# Patient Record
Sex: FEMALE | Race: WHITE | NOT HISPANIC OR LATINO | Employment: OTHER | URBAN - METROPOLITAN AREA
[De-identification: names, ages, dates, MRNs, and addresses within clinical notes are randomized per-mention and may not be internally consistent; named-entity substitution may affect disease eponyms.]

---

## 2024-01-15 ENCOUNTER — TELEPHONE (OUTPATIENT)
Dept: NEUROSURGERY | Facility: CLINIC | Age: 84
End: 2024-01-15

## 2024-01-15 ENCOUNTER — APPOINTMENT (OUTPATIENT)
Dept: LAB | Facility: CLINIC | Age: 84
End: 2024-01-15
Payer: MEDICARE

## 2024-01-15 DIAGNOSIS — I65.29 STENOSIS OF CAROTID ARTERY, UNSPECIFIED LATERALITY: ICD-10-CM

## 2024-01-15 DIAGNOSIS — E87.6 HYPOPOTASSEMIA: ICD-10-CM

## 2024-01-15 DIAGNOSIS — Z13.220 SCREENING FOR LIPOID DISORDERS: ICD-10-CM

## 2024-01-15 LAB
ALBUMIN SERPL BCP-MCNC: 4.2 G/DL (ref 3.5–5)
ALP SERPL-CCNC: 46 U/L (ref 34–104)
ALT SERPL W P-5'-P-CCNC: 13 U/L (ref 7–52)
ANION GAP SERPL CALCULATED.3IONS-SCNC: 7 MMOL/L
AST SERPL W P-5'-P-CCNC: 22 U/L (ref 13–39)
BILIRUB SERPL-MCNC: 0.61 MG/DL (ref 0.2–1)
BUN SERPL-MCNC: 14 MG/DL (ref 5–25)
CALCIUM SERPL-MCNC: 9.4 MG/DL (ref 8.4–10.2)
CHLORIDE SERPL-SCNC: 105 MMOL/L (ref 96–108)
CHOLEST SERPL-MCNC: 139 MG/DL
CO2 SERPL-SCNC: 27 MMOL/L (ref 21–32)
CREAT SERPL-MCNC: 0.98 MG/DL (ref 0.6–1.3)
ERYTHROCYTE [DISTWIDTH] IN BLOOD BY AUTOMATED COUNT: 13.2 % (ref 11.6–15.1)
GFR SERPL CREATININE-BSD FRML MDRD: 53 ML/MIN/1.73SQ M
GLUCOSE P FAST SERPL-MCNC: 100 MG/DL (ref 65–99)
HCT VFR BLD AUTO: 37.3 % (ref 34.8–46.1)
HDLC SERPL-MCNC: 60 MG/DL
HGB BLD-MCNC: 12.2 G/DL (ref 11.5–15.4)
LDLC SERPL CALC-MCNC: 60 MG/DL (ref 0–100)
MCH RBC QN AUTO: 32.2 PG (ref 26.8–34.3)
MCHC RBC AUTO-ENTMCNC: 32.7 G/DL (ref 31.4–37.4)
MCV RBC AUTO: 98 FL (ref 82–98)
NONHDLC SERPL-MCNC: 79 MG/DL
PLATELET # BLD AUTO: 304 THOUSANDS/UL (ref 149–390)
PMV BLD AUTO: 10.8 FL (ref 8.9–12.7)
POTASSIUM SERPL-SCNC: 4.1 MMOL/L (ref 3.5–5.3)
PROT SERPL-MCNC: 7 G/DL (ref 6.4–8.4)
RBC # BLD AUTO: 3.79 MILLION/UL (ref 3.81–5.12)
SODIUM SERPL-SCNC: 139 MMOL/L (ref 135–147)
TRIGL SERPL-MCNC: 94 MG/DL
WBC # BLD AUTO: 4.92 THOUSAND/UL (ref 4.31–10.16)

## 2024-01-15 PROCEDURE — 80053 COMPREHEN METABOLIC PANEL: CPT

## 2024-01-15 PROCEDURE — 85027 COMPLETE CBC AUTOMATED: CPT

## 2024-01-15 PROCEDURE — 80061 LIPID PANEL: CPT

## 2024-01-15 PROCEDURE — 36415 COLL VENOUS BLD VENIPUNCTURE: CPT

## 2024-01-15 NOTE — TELEPHONE ENCOUNTER
Pt sister Katerin is caring for pt who had stroke in [facility] but is now living [place], Dr Brink gave referral it is scanned to chart.  Pt sister will have to assist pt with referral due to speech issue.

## 2024-01-15 NOTE — TELEPHONE ENCOUNTER
1/15/24 CALLED AND SPOKE TO PT SISTER ROLY. SHE'S NOT SURE IF SHE NEEDS TO SCHEDULE WITH NEUROSX, NEUROLOGY, OR VASCULAR. REFERRAL FROM DR RIZZO IS FOR VASCULAR BUT PT SISTER MENTIONED DR PARK. I CALLED REF  OFFICE AND STAFF WILL SEND MSG TO MD TO CLARIFY WHAT SPECIALTY. ADVISED STAFF IF FOR NEUROSX, THEN WE NEED SCAN REPORTS FROM [place] FOR APPT, PROVIDED FAX NUMBER. THEY WILL CALL BACK TO ADVISE.

## 2024-01-17 ENCOUNTER — EVALUATION (OUTPATIENT)
Dept: SPEECH THERAPY | Facility: CLINIC | Age: 84
End: 2024-01-17
Payer: MEDICARE

## 2024-01-17 DIAGNOSIS — I65.29 OCCLUSION AND STENOSIS OF UNSPECIFIED CAROTID ARTERY: ICD-10-CM

## 2024-01-17 DIAGNOSIS — R47.01 COMBINED RECEPTIVE AND EXPRESSIVE APHASIA DUE TO ACUTE CEREBROVASCULAR ACCIDENT (CVA): Primary | ICD-10-CM

## 2024-01-17 DIAGNOSIS — I63.9 COMBINED RECEPTIVE AND EXPRESSIVE APHASIA DUE TO ACUTE CEREBROVASCULAR ACCIDENT (CVA): Primary | ICD-10-CM

## 2024-01-17 PROCEDURE — 92523 SPEECH SOUND LANG COMPREHEN: CPT

## 2024-01-17 NOTE — LETTER
2024    Margarita Brink DO   [facility] [address]  [facility] [address]    Patient: Spring Ramos   YOB: 1940   Date of Visit: 2024     Encounter Diagnosis     ICD-10-CM    1. 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 Occlusion and stenosis of unspecified carotid artery  I65.29           Dear Dr. Brink:    Thank you for your recent referral of Spring Ramos. Please review the attached evaluation summary from Spring's recent visit.     Please verify that you agree with the plan of care by signing the attached order.     If you have any questions or concerns, please do not hesitate to call.     I sincerely appreciate the opportunity to share in the care of one of your patients and hope to have another opportunity to work with you in the near future.     Sincerely,    Selene Singh, SLP      Referring Provider:     Based upon review of the patient's progress and continued therapy plan, it is my medical opinion that Spring Ramos should continue speech therapy treatment at the Speech Therapy at [facility]:                    Margarita Brink DO   [facility] [address]  [facility] [address]  Via Fax: [phone]        Speech-Language Pathology Initial Evaluation    Today's date: 2024   Patient’s name: Spring Ramos  : 1940  MRN: [id number]  Precautions (AE, allergies, behavorial, etc.): s/p CVA    Visit tracking  Referring provider: Non-Epic  Billing guidelines: CMS  Visit # 1  Insurance: Medicare  Re-evaluation projected due date: 3/27/2024    Referring provider name: Margarita Brink DO    Hearing: WFL  Vision: Wears glasses - for reading   Communication/language preferences: Verbal  and Gestural     Home environment/lifestyle: Lives with family with support  Highest level of education: Associate's degree  Vocational status: Retired    Subjective behavioral status: Pleasant,  "Cooperative, and Engaged        History of Present Illness    Patient is a 83 y.o. female referred to outpatient skilled Speech Language Pathology services for a Language evaluation s/p CVA 12/10/2023, wherein she was hospitalized for three days. Pt's sister, Katerin, accompanied the pt to today's evaluation. Pt previously lived in [place], where she was living at time of stroke, and has temporarily relocated to [place], where she currently lives with sister/family. Pt was referred by PCP obtained after relocation.     Pertinent medical hx impacting ST POC includes, but is not limited to:  s/p CVA d/t unspecified occlusion or stenosis of unspecified carotid artery.      Skilled evaluation indicated in order to determine current level of functioning, determine impact and severity of communication deficits, determine impact and severity of aphasia , and develop responsive POC as indicated      Patient/CG subjective report of HPI: Pt reported increased difficulty \"getting her words out\" since stroke - \"I used to speak fine but now it's like my speech is all 'garbled'\". Pt's sister indicated \"it sounds like she has a problem with her speech, but she is able to spell out the words she wants to say\". Both pt and CG indicated increased frustration due to reduced communicative effectiveness. Pt reported she has never had any prior medical issues, so now having to deal with communication difficulties, it has been quite challenging for her.     Patient/CG goal(s): to improve expressive communication; \"help me get my words out better\".       Prior Status    Prior functional status: WFL    Previous therapy: None - per pt report, did not recall being seen by ST, no records on file to confirm     Previous therapy assessment results / tx outcomes:     None reported.         Assessments    The [place] Diagnostic Aphasia Examination-Third Edition (BDAE-3) is a comprehensive standardized assessment designed to evaluate a broad range of language " impairments that often arise as a consequence of organic brain dysfunction. The BDAE-3 is divided into five subtests, including conversational & expository speech, auditory comprehension, oral expression, reading, and writing. The results of the BDAE-3 are used to classify a patient’s language profiles into one of the localization-based classifications of aphasia: Broca’s, Wernicke’s, anomic, conduction, transcortical, transcortical motor, transcortical sensory, and global aphasia syndromes, although the test does not always provide a diagnosis or a therapeutic approach. The following results were obtained during the administration of the short form assessment:       Score: Percentile:   SEVERITY RATING:  3/5 80%ile        FLUENCY:     -Phrase length (rating scale) 777 30%ile   -Melodic line (rating scale) 7/7 100%ile   -Grammatical form (rating scale) 7/7 100%ile        CONVERSATION/EXPOSITORY SPEECH:    -Simple social responses 7/7 100%ile        AUDITORY COMPREHENSION:     -Basic word discrimination 16/16 100%ile   -Commands 8/10 40%ile   -Complex ideational material 3/6 30%ile        ARTICULATION:     -Articulatory agility (rating scale) 5/7 50%ile        RECITATION:     -Automatized sequences 4/4 100%ile        REPETITION:     -Words 3/5 30%ile    -Sentences 1/2 60%ile        NAMING:     -Responsive naming 6/10 40%ile   -[place] Naming Test - short DNT DNT   -Special categories 12/12 100%ile        READING:     -Matching cases & scripts 4/4 100%ile   -Number matching 3/4 20%ile   -Picture-word matching 4/4 100%ile   -Oral word reading 9/15 30%ile   -Oral sentence reading 3/5 70%ile   -Oral sentence comprehension 3/3 100%ile   -Sentence/paragraph comprehension 4/4 100%ile        WRITING:     -Form DNT DNT   -Letter choice DNT DNT   -Motor facility DNT DNT   -Primer words DNT DNT   -Regular phonics DNT DNT   -Common irregular words DNT DNT   -Written picture naming  DNT DNT   -Narrative writing DNT DNT  "      Overall, patient presents with a mild-moderate expressive/receptive aphasia with a severity rating of 3 (out of a possible 5 being fluent speech).     3 - The patient can discuss almost all everyday problems with little or no assistance. Reduction of speech and/or comprehension, however, makes conversation about certain material difficult or impossible.          Impressions    Impressions:   Patient presents with moderate receptive/expressive aphasia, with deficits noted primarily with expressive aphasia and relative strengths demonstrated in receptive skills. However, a breakdown in receptive skills was observed with following commands of increasing length and complexity and complex ideational material. Pt demonstrated significant phonemic (literal) paraphasias during structured tasks as well as spontaneous conversation. Word retrieval deficits noted throughout evaluation, though pt was observed self-correcting and self-cueing on numerous occasions (e.g. pt provides herself with an initial letter cues, \"it begins with an ___\"). Pt also often attempted to spell out the word she was trying to express in order to increase communicative effectiveness. Deficits with expressive language were noted with repetition of words and sentences, responsive naming tasks, and oral word reading. Informal testing of automatic sequences revealed difficulty with stating months of the year. Pt would benefit from additional cognitive testing to determine if there are underlying deficits with memory and executive functioning that may also be impacting communication skills, as pt and sister both reported possible issues at home with STM. Pt would also benefit from further testing in the area of naming to more accurately determine severity level of word retrieval deficits. Pt is a good candidate for skilled ST outpatient therapy as she is highly motivated to return to baseline skills.       Recommendations:  Pt would benefit from " "skilled ST services for Language in order to Enhance functional communication, Improve language skills for complex tasks, Enhance word finding, Increase social participation, and Improve QOL    Rehabilitation prognosis: Good rehab potential to reach and maintain prior level of function        Goals & Intervention Plan    Long-term goals:  Pt will improve receptive and expressive language skills to facilitate independence and safety in home/community for return to baseline status upon d/c.   Patient and caregiver will be educated on and demonstrate effective use of compensatory strategies and cueing hierarchy in a variety of functional tasks to improve quality of life and to maximize level of independence.     Short-term goals:  Pt and clinician will complete fall risk, abuse, and suicide screenings.   Pt and clinician will complete BDAE testing (short form) to determine current level of functioning with writing skills.  Pt and clinician will complete BNT testing to determine severity level of anomia present.  Pt will follow commands of increasing length and complexity (3+ commands) with 80% accuracy given max cues fading to IND.   Pt will state the months of the year with 100% accuracy, IND, over 3 consecutive sessions.  Pt will engage in SFA to support length of utterance, improve sentence content, and improve semantic production with 80% acc given max cues fading to IND.   Pt will engage in responsive naming tasks (e.g. \"what do we tell time with?\") with 90% accuracy given max cues fading to IND.   Pt will produce multisyllabic words accurately during 8/10 opportunities w/o presence of phonemic paraphasias given min v/v cues.   Pt and CG will be educated on aphasia and cueing hierarchy to better understand diagnosis and support carryover.     Frequency: 1-2x/week  Duration: 8-10 weeks    Intervention certification from: 1/17/2024  Intervention certification to: 3/27/2024    Intervention comments:   N/A         "

## 2024-01-17 NOTE — PROGRESS NOTES
"Speech-Language Pathology Initial Evaluation    Today's date: 2024   Patient’s name: Spring Ramos  : 1940  MRN: [id number]  Precautions (AE, allergies, behavorial, etc.): s/p CVA    Visit tracking  Referring provider: Non-Epic  Billing guidelines: CMS  Visit # 1  Insurance: Medicare  Re-evaluation projected due date: 3/27/2024    Referring provider name: Margarita Brink DO    Hearing: WFL  Vision: Wears glasses - for reading   Communication/language preferences: Verbal  and Gestural     Home environment/lifestyle: Lives with family with support  Highest level of education: Associate's degree  Vocational status: Retired    Subjective behavioral status: Pleasant, Cooperative, and Engaged        History of Present Illness    Patient is a 83 y.o. female referred to outpatient skilled Speech Language Pathology services for a Language evaluation s/p CVA 12/10/2023, wherein she was hospitalized for three days. Pt's sister, Katerin, accompanied the pt to today's evaluation. Pt previously lived in [place], where she was living at time of stroke, and has temporarily relocated to [place], where she currently lives with sister/family. Pt was referred by PCP obtained after relocation.     Pertinent medical hx impacting ST POC includes, but is not limited to:  s/p CVA d/t unspecified occlusion or stenosis of unspecified carotid artery.      Skilled evaluation indicated in order to determine current level of functioning, determine impact and severity of communication deficits, determine impact and severity of aphasia , and develop responsive POC as indicated      Patient/CG subjective report of HPI: Pt reported increased difficulty \"getting her words out\" since stroke - \"I used to speak fine but now it's like my speech is all 'garbled'\". Pt's sister indicated \"it sounds like she has a problem with her speech, but she is able to spell out the words she wants to say\". Both pt and CG indicated increased frustration due to " "reduced communicative effectiveness. Pt reported she has never had any prior medical issues, so now having to deal with communication difficulties, it has been quite challenging for her.     Patient/CG goal(s): to improve expressive communication; \"help me get my words out better\".       Prior Status    Prior functional status: WFL    Previous therapy: None - per pt report, did not recall being seen by ST, no records on file to confirm     Previous therapy assessment results / tx outcomes:     None reported.         Assessments    The [place] Diagnostic Aphasia Examination-Third Edition (BDAE-3) is a comprehensive standardized assessment designed to evaluate a broad range of language impairments that often arise as a consequence of organic brain dysfunction. The BDAE-3 is divided into five subtests, including conversational & expository speech, auditory comprehension, oral expression, reading, and writing. The results of the BDAE-3 are used to classify a patient’s language profiles into one of the localization-based classifications of aphasia: Broca’s, Wernicke’s, anomic, conduction, transcortical, transcortical motor, transcortical sensory, and global aphasia syndromes, although the test does not always provide a diagnosis or a therapeutic approach. The following results were obtained during the administration of the short form assessment:       Score: Percentile:   SEVERITY RATING:  3/5 80%ile        FLUENCY:     -Phrase length (rating scale) 777 30%ile   -Melodic line (rating scale) 7/7 100%ile   -Grammatical form (rating scale) 7/7 100%ile        CONVERSATION/EXPOSITORY SPEECH:    -Simple social responses 7/7 100%ile        AUDITORY COMPREHENSION:     -Basic word discrimination 16/16 100%ile   -Commands 8/10 40%ile   -Complex ideational material 3/6 30%ile        ARTICULATION:     -Articulatory agility (rating scale) 5/7 50%ile        RECITATION:     -Automatized sequences 4/4 100%ile        REPETITION:   " "  -Words 3/5 30%ile    -Sentences 1/2 60%ile        NAMING:     -Responsive naming 6/10 40%ile   -[place] Naming Test - short DNT DNT   -Special categories 12/12 100%ile        READING:     -Matching cases & scripts 4/4 100%ile   -Number matching 3/4 20%ile   -Picture-word matching 4/4 100%ile   -Oral word reading 9/15 30%ile   -Oral sentence reading 3/5 70%ile   -Oral sentence comprehension 3/3 100%ile   -Sentence/paragraph comprehension 4/4 100%ile        WRITING:     -Form DNT DNT   -Letter choice DNT DNT   -Motor facility DNT DNT   -Primer words DNT DNT   -Regular phonics DNT DNT   -Common irregular words DNT DNT   -Written picture naming  DNT DNT   -Narrative writing DNT DNT       Overall, patient presents with a mild-moderate expressive/receptive aphasia with a severity rating of 3 (out of a possible 5 being fluent speech).     3 - The patient can discuss almost all everyday problems with little or no assistance. Reduction of speech and/or comprehension, however, makes conversation about certain material difficult or impossible.          Impressions    Impressions:   Patient presents with moderate receptive/expressive aphasia, with deficits noted primarily with expressive aphasia and relative strengths demonstrated in receptive skills. However, a breakdown in receptive skills was observed with following commands of increasing length and complexity and complex ideational material. Pt demonstrated significant phonemic (literal) paraphasias during structured tasks as well as spontaneous conversation. Word retrieval deficits noted throughout evaluation, though pt was observed self-correcting and self-cueing on numerous occasions (e.g. pt provides herself with an initial letter cues, \"it begins with an ___\"). Pt also often attempted to spell out the word she was trying to express in order to increase communicative effectiveness. Deficits with expressive language were noted with repetition of words and sentences, " responsive naming tasks, and oral word reading. Informal testing of automatic sequences revealed difficulty with stating months of the year. Pt would benefit from additional cognitive testing to determine if there are underlying deficits with memory and executive functioning that may also be impacting communication skills, as pt and sister both reported possible issues at home with STM. Pt would also benefit from further testing in the area of naming to more accurately determine severity level of word retrieval deficits. Pt is a good candidate for skilled ST outpatient therapy as she is highly motivated to return to baseline skills.       Recommendations:  Pt would benefit from skilled ST services for Language in order to Enhance functional communication, Improve language skills for complex tasks, Enhance word finding, Increase social participation, and Improve QOL    Rehabilitation prognosis: Good rehab potential to reach and maintain prior level of function        Goals & Intervention Plan    Long-term goals:  Pt will improve receptive and expressive language skills to facilitate independence and safety in home/community for return to baseline status upon d/c.   Patient and caregiver will be educated on and demonstrate effective use of compensatory strategies and cueing hierarchy in a variety of functional tasks to improve quality of life and to maximize level of independence.     Short-term goals:  Pt and clinician will complete fall risk, abuse, and suicide screenings.   Pt and clinician will complete BDAE testing (short form) to determine current level of functioning with writing skills.  Pt and clinician will complete BNT testing to determine severity level of anomia present.  Pt will follow commands of increasing length and complexity (3+ commands) with 80% accuracy given max cues fading to IND.   Pt will state the months of the year with 100% accuracy, IND, over 3 consecutive sessions.  Pt will engage in SFA  "to support length of utterance, improve sentence content, and improve semantic production with 80% acc given max cues fading to IND.   Pt will engage in responsive naming tasks (e.g. \"what do we tell time with?\") with 90% accuracy given max cues fading to IND.   Pt will produce multisyllabic words accurately during 8/10 opportunities w/o presence of phonemic paraphasias given min v/v cues.   Pt and CG will be educated on aphasia and cueing hierarchy to better understand diagnosis and support carryover.     Frequency: 1-2x/week  Duration: 8-10 weeks    Intervention certification from: 1/17/2024  Intervention certification to: 3/27/2024    Intervention comments:   N/A   "

## 2024-01-18 ENCOUNTER — HOSPITAL ENCOUNTER (OUTPATIENT)
Dept: CT IMAGING | Facility: HOSPITAL | Age: 84
End: 2024-01-18
Attending: FAMILY MEDICINE
Payer: MEDICARE

## 2024-01-18 DIAGNOSIS — R06.02 SHORTNESS OF BREATH: ICD-10-CM

## 2024-01-18 PROCEDURE — G1004 CDSM NDSC: HCPCS

## 2024-01-18 PROCEDURE — 71250 CT THORAX DX C-: CPT

## 2024-01-19 NOTE — TELEPHONE ENCOUNTER
1/19/24 [phone] AND LMOM FOR THEM TO CALL BACK TO DISCUSS REFERRAL FOR VASCULAR RECEIVED. CALLED PT SISTER ROLY AND ADVISED I LEFT MSG FOR REF MD, SHE STATES PT HAS APPT WITH MD ON MONDAY AND SHE WILL INQUIRE AS WELL. SENT MSG TO DR PARK TO REVIEW CTA HEAD AND NECK 12/11/23 [place] REPORT TO ADVISE IF PT SHOULD BE SCHEDULED.

## 2024-01-22 NOTE — TELEPHONE ENCOUNTER
1/22/24 IF PT CALLS IN FUTURE WANTING NEUROSX/ DR PARK; DR PARK SAID OK TO SCHEDULE WITH HIM AS LONG AS PT HAS CTA ON DISC.

## 2024-01-22 NOTE — TELEPHONE ENCOUNTER
Margarita Brink called in stating patient is not requiring to be consulted by Neurosurgery. They are going to follow with Vascular.

## 2024-01-24 ENCOUNTER — APPOINTMENT (OUTPATIENT)
Dept: SPEECH THERAPY | Facility: CLINIC | Age: 84
End: 2024-01-24
Payer: MEDICARE

## 2024-01-31 ENCOUNTER — OFFICE VISIT (OUTPATIENT)
Dept: SPEECH THERAPY | Facility: CLINIC | Age: 84
End: 2024-01-31
Payer: MEDICARE

## 2024-01-31 DIAGNOSIS — I63.9 COMBINED RECEPTIVE AND EXPRESSIVE APHASIA DUE TO ACUTE CEREBROVASCULAR ACCIDENT (CVA): Primary | ICD-10-CM

## 2024-01-31 DIAGNOSIS — R47.01 COMBINED RECEPTIVE AND EXPRESSIVE APHASIA DUE TO ACUTE CEREBROVASCULAR ACCIDENT (CVA): Primary | ICD-10-CM

## 2024-01-31 DIAGNOSIS — I65.29 OCCLUSION AND STENOSIS OF UNSPECIFIED CAROTID ARTERY: ICD-10-CM

## 2024-01-31 PROCEDURE — 92507 TX SP LANG VOICE COMM INDIV: CPT

## 2024-01-31 NOTE — PROGRESS NOTES
"Speech Treatment Note    Today's date: 2024  Patient’s name: Spring Ramos  : 1940  MRN: [id number]  Safety measures: n/a  Referring provider: Margarita Brink DO  Visit: # 2    Subjective/Behavioral    Pleasant, Cooperative, and Engaged    Assessment Notes & Comments    Pt and CG had many questions today re pt's overall prognosis and benefited greatly from education provided. Pt will continue to benefit from skilled interventions. Continue with POC.     Intervention/Treatment Goals    Long-term goals:  Pt will improve receptive and expressive language skills to facilitate independence and safety in home/community for return to baseline status upon d/c.   Patient and caregiver will be educated on and demonstrate effective use of compensatory strategies and cueing hierarchy in a variety of functional tasks to improve quality of life and to maximize level of independence.     Short-term goals:  Pt and clinician will complete fall risk, abuse, and suicide screenings.     Pt and clinician will complete BDAE testing (short form) to determine current level of functioning with writing skills.    Pt and clinician will complete BNT testing to determine severity level of anomia present.    Pt will follow commands of increasing length and complexity (3+ commands) with 80% accuracy given max cues fading to IND.     Pt will state the months of the year with 100% accuracy, IND, over 3 consecutive sessions.    Pt will engage in SFA to support length of utterance, improve sentence content, and improve semantic production with 80% acc given max cues fading to IND.     Pt will engage in responsive naming tasks (e.g. \"what do we tell time with?\") with 90% accuracy given max cues fading to IND.   24: completed fill in the blank task with 66% accuracy given min-mod cues.     Pt will produce multisyllabic words accurately during 8/10 opportunities w/o presence of phonemic paraphasias given min v/v cues.     Pt and CG " will be educated on aphasia and cueing hierarchy to better understand diagnosis and support carryover.   1/31/24: Pt and CG provided with extensive education (30+ minutes) re cueing hierarchy with handout to follow, modeling provided, explanation provided and re aphasia etiology, symptoms, prognosis, etc.     Frequency: 1-2x/week  Duration: 8-10 weeks    Intervention certification from: 1/17/2024  Intervention certification to: 3/27/2024    Intervention comments:   N/A     Plan    Pt was provided with HEP/Activities to support POC and Continue with POC

## 2024-02-07 ENCOUNTER — OFFICE VISIT (OUTPATIENT)
Dept: SPEECH THERAPY | Facility: CLINIC | Age: 84
End: 2024-02-07
Payer: MEDICARE

## 2024-02-07 DIAGNOSIS — I63.9 COMBINED RECEPTIVE AND EXPRESSIVE APHASIA DUE TO ACUTE CEREBROVASCULAR ACCIDENT (CVA): Primary | ICD-10-CM

## 2024-02-07 DIAGNOSIS — I65.29 OCCLUSION AND STENOSIS OF UNSPECIFIED CAROTID ARTERY: ICD-10-CM

## 2024-02-07 DIAGNOSIS — R47.01 COMBINED RECEPTIVE AND EXPRESSIVE APHASIA DUE TO ACUTE CEREBROVASCULAR ACCIDENT (CVA): Primary | ICD-10-CM

## 2024-02-07 PROCEDURE — 92507 TX SP LANG VOICE COMM INDIV: CPT

## 2024-02-07 NOTE — PROGRESS NOTES
"Speech Treatment Note    Today's date: 2024  Patient’s name: Spring Ramos  : 1940  MRN: [id number]  Safety measures: n/a  Referring provider: Margarita Brink DO  Visit: # 3    Subjective/Behavioral    Pleasant, Cooperative, and Engaged    Assessment Notes & Comments    Pt and CG (sister) benefit from skilled interventions for education re cueing strategies, appropriate level activities, aphasia education, etc. Pt already demonstrating some improvement with responding to cues and is benefiting from interventions to support word finding. Continue with POC.     Intervention/Treatment Goals    Long-term goals:  Pt will improve receptive and expressive language skills to facilitate independence and safety in home/community for return to baseline status upon d/c.   Patient and caregiver will be educated on and demonstrate effective use of compensatory strategies and cueing hierarchy in a variety of functional tasks to improve quality of life and to maximize level of independence.     Short-term goals:  Pt and clinician will complete fall risk, abuse, and suicide screenings.     Pt and clinician will complete BDAE testing (short form) to determine current level of functioning with writing skills.    Pt and clinician will complete BNT testing to determine severity level of anomia present.    Pt will follow commands of increasing length and complexity (3+ commands) with 80% accuracy given max cues fading to IND.     Pt will state the months of the year with 100% accuracy, IND, over 3 consecutive sessions.    Pt will engage in SFA to support length of utterance, improve sentence content, and improve semantic production with 80% acc given max cues fading to IND.   24: pt completed SFA with graphic organizer and gradual release from cueing mod/max to min - 3/3 words (all categories addressed)    Pt will engage in responsive naming tasks (e.g. \"what do we tell time with?\") with 90% accuracy given max cues " fading to IND.   1/31/24: completed fill in the blank task with 66% accuracy given min-mod cues.   2/7/24: challenge descriptions - 6/7 given mod v/v cues.     Pt will produce multisyllabic words accurately during 8/10 opportunities w/o presence of phonemic paraphasias given min v/v cues.     Pt and CG will be educated on aphasia and cueing hierarchy to better understand diagnosis and support carryover.   1/31/24: Pt and CG provided with extensive education (30+ minutes) re cueing hierarchy with handout to follow, modeling provided, explanation provided and re aphasia etiology, symptoms, prognosis, etc.     Frequency: 1-2x/week  Duration: 8-10 weeks    Intervention certification from: 1/17/2024  Intervention certification to: 3/27/2024    Intervention comments:   Recommend addressing apraxia (multisyllabic words) with backward chaining     Plan    Pt was provided with HEP/Activities to support POC and Continue with POC

## 2024-02-14 ENCOUNTER — OFFICE VISIT (OUTPATIENT)
Dept: SPEECH THERAPY | Facility: CLINIC | Age: 84
End: 2024-02-14
Payer: MEDICARE

## 2024-02-14 DIAGNOSIS — I63.9 COMBINED RECEPTIVE AND EXPRESSIVE APHASIA DUE TO ACUTE CEREBROVASCULAR ACCIDENT (CVA): Primary | ICD-10-CM

## 2024-02-14 DIAGNOSIS — I65.29 OCCLUSION AND STENOSIS OF UNSPECIFIED CAROTID ARTERY: ICD-10-CM

## 2024-02-14 DIAGNOSIS — R47.01 COMBINED RECEPTIVE AND EXPRESSIVE APHASIA DUE TO ACUTE CEREBROVASCULAR ACCIDENT (CVA): Primary | ICD-10-CM

## 2024-02-14 PROCEDURE — 92507 TX SP LANG VOICE COMM INDIV: CPT

## 2024-02-14 NOTE — PROGRESS NOTES
"Speech Treatment Note    Today's date: 2024  Patient’s name: Spring Ramos  : 1940  MRN: [id number]  Safety measures: n/a  Referring provider: Margarita Brink DO  Visit: # 4    Subjective/Behavioral    Pleasant, Cooperative, and Engaged    Assessment Notes & Comments    Pt's niece, Vianey, present for session today and engaged in cueing strategies throughout. Education provided to both re ideas to practice writing numbers through copying and dictation at home (start with double digits only). Education provided on \"zone of proximal development\" as it pertains to stroke recovery. Slight reduction in cues for SFA activity today vs last session which demonstrates improvement.     Intervention/Treatment Goals    Long-term goals:  Pt will improve receptive and expressive language skills to facilitate independence and safety in home/community for return to baseline status upon d/c.   Patient and caregiver will be educated on and demonstrate effective use of compensatory strategies and cueing hierarchy in a variety of functional tasks to improve quality of life and to maximize level of independence.     Short-term goals:  Pt and clinician will complete fall risk, abuse, and suicide screenings.     Pt and clinician will complete BDAE testing (short form) to determine current level of functioning with writing skills.    Pt and clinician will complete BNT testing to determine severity level of anomia present.    Pt will follow commands of increasing length and complexity (3+ commands) with 80% accuracy given max cues fading to IND.     Pt will state the months of the year with 100% accuracy, IND, over 3 consecutive sessions.    Pt will engage in SFA to support length of utterance, improve sentence content, and improve semantic production with 80% acc given max cues fading to IND.   24: pt completed SFA with graphic organizer and gradual release from cueing mod/max to min - 3/3 words (all categories " "addressed)  2/14/24: pt completed SFA with graphic organizer with min cues and consistent verbal prompting, 2/2 words (all categories addressed)    Pt will engage in responsive naming tasks (e.g. \"what do we tell time with?\") with 90% accuracy given max cues fading to IND.   1/31/24: completed fill in the blank task with 66% accuracy given min-mod cues.   2/7/24: challenge descriptions - 6/7 given mod v/v cues.     Pt will produce multisyllabic words accurately during 8/10 opportunities w/o presence of phonemic paraphasias given min v/v cues.     Pt and CG will be educated on aphasia and cueing hierarchy to better understand diagnosis and support carryover.   1/31/24: Pt and CG provided with extensive education (30+ minutes) re cueing hierarchy with handout to follow, modeling provided, explanation provided and re aphasia etiology, symptoms, prognosis, etc.   2/14/24: education provided (15 minutes) on strategies to support writing at home via copying and dictation. 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

## 2024-02-15 ENCOUNTER — OFFICE VISIT (OUTPATIENT)
Dept: CARDIOLOGY CLINIC | Facility: CLINIC | Age: 84
End: 2024-02-15
Payer: MEDICARE

## 2024-02-15 VITALS
SYSTOLIC BLOOD PRESSURE: 112 MMHG | BODY MASS INDEX: 22.83 KG/M2 | HEIGHT: 57 IN | WEIGHT: 105.8 LBS | HEART RATE: 83 BPM | DIASTOLIC BLOOD PRESSURE: 60 MMHG

## 2024-02-15 DIAGNOSIS — I34.0 NONRHEUMATIC MITRAL VALVE REGURGITATION: Primary | ICD-10-CM

## 2024-02-15 DIAGNOSIS — I42.9 CARDIOMYOPATHY, UNSPECIFIED TYPE (HCC): ICD-10-CM

## 2024-02-15 DIAGNOSIS — R06.02 SHORTNESS OF BREATH: ICD-10-CM

## 2024-02-15 DIAGNOSIS — I63.9 CEREBROVASCULAR ACCIDENT (CVA), UNSPECIFIED MECHANISM (HCC): ICD-10-CM

## 2024-02-15 DIAGNOSIS — I50.33 ACUTE ON CHRONIC DIASTOLIC (CONGESTIVE) HEART FAILURE (HCC): ICD-10-CM

## 2024-02-15 DIAGNOSIS — I63.239 CAROTID STENOSIS, SYMPTOMATIC, WITH INFARCTION (HCC): ICD-10-CM

## 2024-02-15 PROCEDURE — 93000 ELECTROCARDIOGRAM COMPLETE: CPT | Performed by: INTERNAL MEDICINE

## 2024-02-15 PROCEDURE — 99205 OFFICE O/P NEW HI 60 MIN: CPT | Performed by: INTERNAL MEDICINE

## 2024-02-15 RX ORDER — TORSEMIDE 10 MG/1
20 TABLET ORAL DAILY
COMMUNITY
End: 2024-02-15 | Stop reason: SDUPTHER

## 2024-02-15 RX ORDER — AMLODIPINE BESYLATE 10 MG/1
5 TABLET ORAL DAILY
COMMUNITY

## 2024-02-15 RX ORDER — ROSUVASTATIN CALCIUM 10 MG/1
10 TABLET, COATED ORAL DAILY
COMMUNITY

## 2024-02-15 RX ORDER — VITAMIN K2 90 MCG
1 CAPSULE ORAL DAILY
COMMUNITY

## 2024-02-15 RX ORDER — POTASSIUM CHLORIDE 750 MG/1
10 TABLET, FILM COATED, EXTENDED RELEASE ORAL 2 TIMES DAILY
COMMUNITY

## 2024-02-15 RX ORDER — TORSEMIDE 20 MG/1
TABLET ORAL
Qty: 60 TABLET | Refills: 1 | Status: SHIPPED | OUTPATIENT
Start: 2024-02-15

## 2024-02-15 RX ORDER — ALBUTEROL SULFATE 90 UG/1
2 AEROSOL, METERED RESPIRATORY (INHALATION) EVERY 6 HOURS PRN
COMMUNITY

## 2024-02-15 RX ORDER — CLOPIDOGREL BISULFATE 75 MG/1
75 TABLET ORAL DAILY
COMMUNITY

## 2024-02-15 NOTE — PROGRESS NOTES
[facility] [place]   [address] [facility] [id number]                                            Cardiology Office Consult  Spring Ramos, 83 y.o. female  YOB: 1940  MRN: [id number] Encounter: [id number]      PCP - Margarita Brink DO  Referring Provider - Self, Referral    Chief Complaint   Patient presents with   • New Patient Visit     Hx Stroke        Assessment  Mitral regurgitation  TTE ([place], [place]) - 12/2024 - LVEF 50%, hypokinesis of inferolateral and anterolateral walls, moderate-severe MR, mild-moderate PHTN - PASP 41  Chronic diastolic heart failure  Shortness of breath  Recent left parietal stroke  Recent type 2 MI  Left carotid artery stenosis  Hypertension  Hyperlipidemia    Plan  Acute on chronic diastolic heart failure, mitral regurgitation, moderate to severe  Clinically appears volume overloaded with significant lower extremity edema, orthopnea and PND  Currently on torsemide 10 mg daily, which was started in December 2023  Increase torsemide to 40 mg daily for 3 days and then continue torsemide 20 mg daily  Check follow-up echocardiogram to better evaluate MR and LVEF  Check follow-up BMP in 1 week  Reduce amlodipine to 5 mg daily to help with pedal edema & monitor BP    Cardiomyopathy, recent type 2 MI  She was found to have low normal EF at 50% with hypokinesis of lateral walls and there were concerns about type II MI  She does continue to have shortness of breath which appears to be related to heart failure  Check follow-up echocardiogram and evaluate further for ischemia with nuclear Lexiscan stress    Hypertension  Blood pressure well-controlled, 112/60  She is on high-dose amlodipine 10 mg daily and has significant lower extremity edema, some of which may be from the amlodipine  Decrease amlodipine to 5 mg daily and monitor blood pressure    Recent stroke, Left carotid artery stenosis  In 12/2023 with aphasia ->  improving with speech therapy  On evaluation in [place], this was felt to be related to severe left carotid artery stenosis and she was recommended carotid endarterectomy but she adamantly refused  She was prescribed aspirin, Plavix and also placed on Eliquis for 30 days --> she uses for 3 days but then had significant nosebleed and as a result stop Eliquis by herself  Remains on aspirin, Plavix, rosuvastatin per primary  Follow-up with PCP/neurologist/vascular surgeon for further treatment    Results for orders placed or performed in visit on 02/15/24   POCT ECG    Impression    Sinus rhythm with PACs  Otherwise normal ECG       Orders Placed This Encounter   Procedures   • Basic metabolic panel   • POCT ECG   • Echo complete w/ contrast if indicated     Return in about 4 weeks (around 3/14/2024), or if symptoms worsen or fail to improve.    Extensive review of limited available records from [facility] system was necessary for today's visit.  Additionally I have reached out to the hospital where she was admitted in [place] in order to try and get her records linked up in epic.  Extensively counseled both patient, her sister and niece about the cardiac problems and need for further evaluation and management.      History of Present Illness   83 y.o. female comes in as a new patient for establishment of care regarding recent findings of heart failure and type 2 MI    She used to live in [place] and in December 2023 had issues with vision problems and speech difficulty which led her into the hospital in [facility].  She was eventually diagnosed with left parietal stroke and was found to have severe left carotid artery stenosis.  She was recommended carotid endarterectomy, but apparently due to concerns about her  having had a stroke during such procedure she adamantly refused to get this done.  During this hospitalization, she also had issues with mild troponin elevation in the 700s, with concerns  about type II MI.  She did not have any chest pain or shortness of breath at that time and an echocardiogram performed during this showed low normal EF at 50% with inferolateral hypokinesis and moderate to severe MR.  There is concerned about probable old left circumflex territory infarct and resultant MR.  She was treated with IV diuresis during this and recommended outpatient cardiology follow-up.  She was discharged within a short period.    Subsequently, as most of her family including her sister and niece live appear in the [facility] area they decided to bring her here instead to help care for her.  She has transition to getting speech therapy here and is doing a lot better.  She was subsequently established with a PCP and is now here for further cardiac consultation and is awaiting vascular surgery follow-up as well    No current complaints of chest pain, palpitations or dizziness.  She does report ongoing issues with shortness of breath with mild to moderate exertion as well as orthopnea and PND.  She has had increased lower extremity edema over the last month.        Historical Information   Past Medical History:   Diagnosis Date   • Acute ischemic left middle cerebral artery (MCA) stroke 12/11/2023   • Carotid artery stenosis    • Diastolic CHF    • Hyperlipidemia    • Hypertension    • Mitral regurgitation    • NSTEMI (non-ST elevated myocardial infarction) 12/11/2023     History reviewed. No pertinent surgical history.  Family History   Problem Relation Age of Onset   • Kidney failure Mother    • Heart disease Father    • Diabetes Father    • Hypertension Father      Current Outpatient Medications   Medication Instructions   • albuterol (PROVENTIL HFA,VENTOLIN HFA) 90 mcg/act inhaler 2 puffs, Inhalation, Every 6 hours PRN   • amLODIPine (NORVASC) 5 mg, Oral, Daily   • Aspirin 81 MG CAPS 1 capsule, Oral, Daily   • Cholecalciferol (Vitamin D3) 1000 units CAPS 1 tablet, Oral, Daily   • clopidogrel  "(PLAVIX) 75 mg, Oral, Daily   • Coenzyme Q10 (Co Q 10) 100 MG CAPS 1 capsule, Oral, Daily   • potassium chloride (Klor-Con) 10 mEq tablet 10 mEq, Oral, 2 times daily   • rosuvastatin (CRESTOR) 10 mg, Oral, Daily   • torsemide (DEMADEX) 20 mg tablet Take 2 tablets daily for 3 days, and then decrease to 1 tablet daily      No Known Allergies  Social History     Socioeconomic History   • Marital status:      Spouse name: None   • Number of children: None   • Years of education: None   • Highest education level: None   Occupational History   • None   Tobacco Use   • Smoking status: Former     Types: Cigarettes   • Smokeless tobacco: Never   Vaping Use   • Vaping status: Never Used   Substance and Sexual Activity   • Alcohol use: Not Currently   • Drug use: Never   • Sexual activity: None   Other Topics Concern   • None   Social History Narrative   • None     Social Determinants of Health     Financial Resource Strain: Not on file   Food Insecurity: Not on file   Transportation Needs: Not on file   Physical Activity: Not on file   Stress: Not on file   Social Connections: Not on file   Intimate Partner Violence: Not on file   Housing Stability: Not on file        Review of Systems   All other systems reviewed and are negative.        Vitals:  Vitals:    02/15/24 1409   BP: 112/60   Pulse: 83   Weight: 48 kg (105 lb 12.8 oz)   Height: 4' 9\" (1.448 m)     BMI - Body mass index is 22.89 kg/m².  Wt Readings from Last 7 Encounters:   02/15/24 48 kg (105 lb 12.8 oz)       Physical Exam  Vitals and nursing note reviewed.   Constitutional:       General: She is not in acute distress.     Appearance: Normal appearance. She is well-developed. She is not ill-appearing.   HENT:      Head: Normocephalic and atraumatic.      Nose: No congestion.   Eyes:      General: No scleral icterus.     Conjunctiva/sclera: Conjunctivae normal.   Neck:      Vascular: No carotid bruit or JVD.   Cardiovascular:      Rate and Rhythm: Normal " "rate and regular rhythm. Occasional Extrasystoles are present.     Pulses: Normal pulses.      Heart sounds: Normal heart sounds. No murmur heard.     No friction rub. No gallop.   Pulmonary:      Effort: Pulmonary effort is normal. No respiratory distress.      Breath sounds: Normal breath sounds. No rales.   Abdominal:      General: There is no distension.      Palpations: Abdomen is soft.      Tenderness: There is no abdominal tenderness.   Musculoskeletal:         General: No swelling or tenderness.      Cervical back: Neck supple.      Right lower leg: Edema present.      Left lower leg: Edema present.   Skin:     General: Skin is warm.   Neurological:      Mental Status: She is alert and oriented to person, place, and time. Mental status is at baseline.      Comments: Intermittent, brief aphasia/speech difficulty - overall much improved   Psychiatric:         Attention and Perception: Attention normal.         Mood and Affect: Mood normal.         Behavior: Behavior normal. Behavior is cooperative.         Thought Content: Thought content normal.         Labs:  CBC:   Lab Results   Component Value Date    WBC 4.92 01/15/2024    RBC 3.79 (L) 01/15/2024    HGB 12.2 01/15/2024    HCT 37.3 01/15/2024    MCV 98 01/15/2024     01/15/2024    RDW 13.2 01/15/2024       CMP:   Lab Results   Component Value Date    K 4.1 01/15/2024     01/15/2024    CO2 27 01/15/2024    BUN 14 01/15/2024    CREATININE 0.98 01/15/2024    EGFR 53 01/15/2024    CALCIUM 9.4 01/15/2024    AST 22 01/15/2024    ALT 13 01/15/2024    ALKPHOS 46 01/15/2024       Magnesium:  No results found for: \"MG\"    Lipid Profile:   Lab Results   Component Value Date    HDL 60 01/15/2024    TRIG 94 01/15/2024    LDLCALC 60 01/15/2024       Thyroid Studies: No results found for: \"GID8SVAUDHOG\", \"T3FREE\", \"FREET4\", \"Z2UOSHY\", \"M8GVIIO\"    A1c:  No components found for: \"HGA1C\"    INR:  No results found for: \"INR\"5    Cardiac testing:   No results " found for this or any previous visit.    No results found for this or any previous visit.    No results found for this or any previous visit.    No results found for this or any previous visit.      CT chest wo contrast  Narrative: CT CHEST WITHOUT IV CONTRAST    INDICATION: R06.02: Shortness of breath.    COMPARISON: None.    TECHNIQUE: CT examination of the chest was performed without intravenous contrast. Multiplanar 2D reformatted images were created from the source data.    This examination, like all CT scans performed in the [facility], was performed utilizing techniques to minimize radiation dose exposure, including the use of iterative reconstruction and automated exposure control. Radiation dose length   product (DLP) for this visit: 224.42 mGy-cm    FINDINGS:    LUNGS: Mild centrilobular emphysema. Biapical pleural-parenchymal scarring. Bibasilar atelectasis. Mild bronchial wall thickening in the posterior lungs bilaterally, which could be due to bronchitis. No nodules or focal consolidations. Scattered   granulomata. No tracheal or endobronchial lesions.    PLEURA: Unremarkable.    HEART/GREAT VESSELS: Moderate atherosclerotic vascular calcifications of the thoracic aorta and great vessels. Top normal heart size. Severe coronary artery vascular calcification. Mild calcifications of the mitral mitral and aortic annulus. No thoracic   aortic aneurysm.    MEDIASTINUM AND DENYS: Unremarkable.    CHEST WALL AND LOWER NECK: Unremarkable.    VISUALIZED STRUCTURES IN THE UPPER ABDOMEN: Unremarkable.    OSSEOUS STRUCTURES: Advanced spinal degenerative changes are noted. No acute fracture or destructive osseous lesion.  Impression: No acute findings in the chest.    Centrilobular emphysema and bronchitis related changes.    Severe coronary artery calcification, a marker of coronary artery disease. Moderate-severe atherosclerotic vascular calcifications of the thoracic aorta and great  vessels.    Resident: LUCIANA CERDA I, the attending radiologist, have reviewed the images and agree with the final report above.    Workstation performed: [id number]

## 2024-02-21 ENCOUNTER — APPOINTMENT (OUTPATIENT)
Dept: LAB | Facility: CLINIC | Age: 84
End: 2024-02-21
Payer: MEDICARE

## 2024-02-21 ENCOUNTER — OFFICE VISIT (OUTPATIENT)
Dept: SPEECH THERAPY | Facility: CLINIC | Age: 84
End: 2024-02-21
Payer: MEDICARE

## 2024-02-21 DIAGNOSIS — I42.9 CARDIOMYOPATHY, UNSPECIFIED TYPE (HCC): ICD-10-CM

## 2024-02-21 DIAGNOSIS — I65.29 OCCLUSION AND STENOSIS OF UNSPECIFIED CAROTID ARTERY: ICD-10-CM

## 2024-02-21 DIAGNOSIS — I63.9 COMBINED RECEPTIVE AND EXPRESSIVE APHASIA DUE TO ACUTE CEREBROVASCULAR ACCIDENT (CVA): Primary | ICD-10-CM

## 2024-02-21 DIAGNOSIS — R47.01 COMBINED RECEPTIVE AND EXPRESSIVE APHASIA DUE TO ACUTE CEREBROVASCULAR ACCIDENT (CVA): Primary | ICD-10-CM

## 2024-02-21 DIAGNOSIS — R06.02 SHORTNESS OF BREATH: ICD-10-CM

## 2024-02-21 DIAGNOSIS — I34.0 NONRHEUMATIC MITRAL VALVE REGURGITATION: ICD-10-CM

## 2024-02-21 LAB
ANION GAP SERPL CALCULATED.3IONS-SCNC: 9 MMOL/L
BUN SERPL-MCNC: 19 MG/DL (ref 5–25)
CALCIUM SERPL-MCNC: 9.1 MG/DL (ref 8.4–10.2)
CHLORIDE SERPL-SCNC: 103 MMOL/L (ref 96–108)
CO2 SERPL-SCNC: 28 MMOL/L (ref 21–32)
CREAT SERPL-MCNC: 1.11 MG/DL (ref 0.6–1.3)
GFR SERPL CREATININE-BSD FRML MDRD: 46 ML/MIN/1.73SQ M
GLUCOSE P FAST SERPL-MCNC: 102 MG/DL (ref 65–99)
POTASSIUM SERPL-SCNC: 3.9 MMOL/L (ref 3.5–5.3)
SODIUM SERPL-SCNC: 140 MMOL/L (ref 135–147)

## 2024-02-21 PROCEDURE — 92507 TX SP LANG VOICE COMM INDIV: CPT

## 2024-02-21 PROCEDURE — 36415 COLL VENOUS BLD VENIPUNCTURE: CPT

## 2024-02-21 PROCEDURE — 80048 BASIC METABOLIC PNL TOTAL CA: CPT

## 2024-02-21 NOTE — PROGRESS NOTES
Speech Treatment Note    Today's date: 2024  Patient’s name: Spring Ramos  : 1940  MRN: [id number]  Safety measures: n/a  Referring provider: Margarita Brink DO  Visit: # 4    Subjective/Behavioral    Pleasant, Cooperative, and Engaged    Assessment Notes & Comments    Pt's sister, Katerin, present for session today and involved in education to support cueing as well as ideas for activities to engage in at home. Pt performed well on tasks given cues fading from max-min/mod. Continue with POC.     Intervention/Treatment Goals    Long-term goals:  Pt will improve receptive and expressive language skills to facilitate independence and safety in home/community for return to baseline status upon d/c.   Patient and caregiver will be educated on and demonstrate effective use of compensatory strategies and cueing hierarchy in a variety of functional tasks to improve quality of life and to maximize level of independence.     Short-term goals:  Pt and clinician will complete fall risk, abuse, and suicide screenings.     Pt and clinician will complete BDAE testing (short form) to determine current level of functioning with writing skills.    Pt and clinician will complete BNT testing to determine severity level of anomia present.    Pt will follow commands of increasing length and complexity (3+ commands) with 80% accuracy given max cues fading to IND.     Pt will state the months of the year with 100% accuracy, IND, over 3 consecutive sessions.    Pt will engage in SFA to support length of utterance, improve sentence content, and improve semantic production with 80% acc given max cues fading to IND.   24: pt completed SFA with graphic organizer and gradual release from cueing mod/max to min - 3/3 words (all categories addressed)  24: pt completed SFA with graphic organizer with min cues and consistent verbal prompting, 2/2 words (all categories addressed)    Pt will engage in responsive naming tasks  "(e.g. \"what do we tell time with?\") with 90% accuracy given max cues fading to IND.   1/31/24: completed fill in the blank task with 66% accuracy given min-mod cues.   2/7/24: challenge descriptions - 6/7 given mod v/v cues.  2/21/24: responsive naming task (open ended) during Free From [employer]ble - 10/10 words given fading max-mod/min cues.     Pt will produce multisyllabic words accurately during 8/10 opportunities w/o presence of phonemic paraphasias given min v/v cues.     Pt and CG will be educated on aphasia and cueing hierarchy to better understand diagnosis and support carryover.   1/31/24: Pt and CG provided with extensive education (30+ minutes) re cueing hierarchy with handout to follow, modeling provided, explanation provided and re aphasia etiology, symptoms, prognosis, etc.   2/14/24: education provided (15 minutes) on strategies to support writing at home via copying and dictation. 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

## 2024-02-23 ENCOUNTER — TELEPHONE (OUTPATIENT)
Dept: VASCULAR SURGERY | Facility: CLINIC | Age: 84
End: 2024-02-23

## 2024-02-23 ENCOUNTER — HOSPITAL ENCOUNTER (OUTPATIENT)
Dept: NON INVASIVE DIAGNOSTICS | Facility: CLINIC | Age: 84
Discharge: HOME | End: 2024-02-23
Payer: MEDICARE

## 2024-02-23 VITALS
SYSTOLIC BLOOD PRESSURE: 112 MMHG | HEIGHT: 58 IN | WEIGHT: 105 LBS | DIASTOLIC BLOOD PRESSURE: 60 MMHG | BODY MASS INDEX: 22.04 KG/M2 | HEART RATE: 76 BPM

## 2024-02-23 DIAGNOSIS — I34.0 NONRHEUMATIC MITRAL VALVE REGURGITATION: ICD-10-CM

## 2024-02-23 LAB
AORTIC ROOT: 2.6 CM
AORTIC VALVE MEAN VELOCITY: 7.8 M/S
APICAL FOUR CHAMBER EJECTION FRACTION: 56 %
ASCENDING AORTA: 2.8 CM
AV AREA BY CONTINUOUS VTI: 2.2 CM2
AV AREA PEAK VELOCITY: 2.1 CM2
AV LVOT MEAN GRADIENT: 1 MMHG
AV LVOT PEAK GRADIENT: 3 MMHG
AV MEAN GRADIENT: 3 MMHG
AV PEAK GRADIENT: 5 MMHG
AV VALVE AREA: 2.17 CM2
AV VELOCITY RATIO: 0.75
BSA FOR ECHO PROCEDURE: 1.38 M2
DOP CALC AO PEAK VEL: 1.16 M/S
DOP CALC AO VTI: 24.03 CM
DOP CALC LVOT AREA: 2.83 CM2
DOP CALC LVOT CARDIAC INDEX: 2.27 L/MIN/M2
DOP CALC LVOT CARDIAC OUTPUT: 3.1 L/MIN
DOP CALC LVOT DIAMETER: 1.9 CM
DOP CALC LVOT PEAK VEL VTI: 18.44 CM
DOP CALC LVOT PEAK VEL: 0.87 M/S
DOP CALC LVOT STROKE INDEX: 36.5 ML/M2
DOP CALC LVOT STROKE VOLUME: 52.26
E WAVE DECELERATION TIME: 169 MS
E/A RATIO: 0.94
FRACTIONAL SHORTENING: 29 (ref 28–44)
INTERVENTRICULAR SEPTUM IN DIASTOLE (PARASTERNAL SHORT AXIS VIEW): 1 CM
INTERVENTRICULAR SEPTUM: 1 CM (ref 0.6–1.1)
LAAS-AP2: 16.7 CM2
LAAS-AP4: 16.6 CM2
LEFT ATRIUM SIZE: 3.3 CM
LEFT ATRIUM VOLUME (MOD BIPLANE): 46 ML
LEFT ATRIUM VOLUME INDEX (MOD BIPLANE): 33.6 ML/M2
LEFT INTERNAL DIMENSION IN SYSTOLE: 3.2 CM (ref 2.1–4)
LEFT VENTRICULAR INTERNAL DIMENSION IN DIASTOLE: 4.5 CM (ref 3.5–6)
LEFT VENTRICULAR POSTERIOR WALL IN END DIASTOLE: 0.8 CM
LEFT VENTRICULAR STROKE VOLUME: 52 ML
LVSV (TEICH): 52 ML
MITRAL REGURGITATION PEAK VELOCITY: 5.18 M/S
MITRAL VALVE MEAN INFLOW VELOCITY: 4.02 M/S
MITRAL VALVE REGURGITANT PEAK GRADIENT: 107 MMHG
MV E'TISSUE VEL-SEP: 8 CM/S
MV PEAK A VEL: 0.87 M/S
MV PEAK E VEL: 82 CM/S
MV STENOSIS PRESSURE HALF TIME: 49 MS
MV VALVE AREA P 1/2 METHOD: 4.49
RA PRESSURE ESTIMATED: 10 MMHG
RIGHT ATRIUM AREA SYSTOLE A4C: 15.7 CM2
RIGHT VENTRICLE ID DIMENSION: 2.8 CM
RV PSP: 46 MMHG
SL CV DOP CALC MV VTI RETROGRADE: 176.1 CM
SL CV LEFT ATRIUM LENGTH A2C: 5.1 CM
SL CV LV EF: 50
SL CV MV MEAN GRADIENT RETROGRADE: 72 MMHG
SL CV PED ECHO LEFT VENTRICLE DIASTOLIC VOLUME (MOD BIPLANE) 2D: 93 ML
SL CV PED ECHO LEFT VENTRICLE SYSTOLIC VOLUME (MOD BIPLANE) 2D: 41 ML
TR MAX PG: 36 MMHG
TR PEAK VELOCITY: 3 M/S
TRICUSPID ANNULAR PLANE SYSTOLIC EXCURSION: 1.5 CM
TRICUSPID VALVE PEAK REGURGITATION VELOCITY: 3.21 M/S

## 2024-02-23 PROCEDURE — 93306 TTE W/DOPPLER COMPLETE: CPT | Performed by: INTERNAL MEDICINE

## 2024-02-23 PROCEDURE — 93306 TTE W/DOPPLER COMPLETE: CPT

## 2024-02-28 ENCOUNTER — APPOINTMENT (OUTPATIENT)
Dept: SPEECH THERAPY | Facility: CLINIC | Age: 84
End: 2024-02-28
Payer: MEDICARE

## 2024-02-28 ENCOUNTER — CONSULT (OUTPATIENT)
Dept: VASCULAR SURGERY | Facility: CLINIC | Age: 84
End: 2024-02-28
Payer: MEDICARE

## 2024-02-28 VITALS
SYSTOLIC BLOOD PRESSURE: 118 MMHG | HEART RATE: 63 BPM | OXYGEN SATURATION: 99 % | BODY MASS INDEX: 22.17 KG/M2 | DIASTOLIC BLOOD PRESSURE: 58 MMHG | WEIGHT: 105.6 LBS | HEIGHT: 58 IN

## 2024-02-28 DIAGNOSIS — N18.30 STAGE 3 CHRONIC KIDNEY DISEASE, UNSPECIFIED WHETHER STAGE 3A OR 3B CKD (HCC): ICD-10-CM

## 2024-02-28 DIAGNOSIS — I65.22 CAROTID STENOSIS, LEFT: ICD-10-CM

## 2024-02-28 DIAGNOSIS — I65.22 LEFT CAROTID STENOSIS: Primary | ICD-10-CM

## 2024-02-28 DIAGNOSIS — I63.232 CEREBROVASCULAR ACCIDENT (CVA) DUE TO STENOSIS OF LEFT CAROTID ARTERY (HCC): ICD-10-CM

## 2024-02-28 PROBLEM — I63.9 STROKE (HCC): Status: ACTIVE | Noted: 2024-02-28

## 2024-02-28 PROCEDURE — 99203 OFFICE O/P NEW LOW 30 MIN: CPT | Performed by: SURGERY

## 2024-02-28 NOTE — PROGRESS NOTES
Assessment/Plan:    Carotid stenosis, left  Patient is an 83-year-old woman who is accompanied to the office by her sister.  She is referred by her PCP.  Patient normally resides in [place].  Back in December she was hospitalized and diagnosed with a stroke and high-grade left carotid stenosis.  At that time left carotid endarterectomy was recommended however patient adamantly refused.  She is now temporarily living here and [place] with family.  I do not have any actual images to review.  I do have the CT report which does suggest a 70% stenosis.  We discussed the indications for carotid intervention for symptomatic disease.  I do believe she meets qualification for left carotid intervention.  I requested that she attempt to obtain the CT images from [place] however she doubts that she would be able to obtain in a timely fashion.  As such I have ordered a CTA of the neck to assist with operative planning.  Patient to return to the office once CTA of the neck is completed.  She should remain on antiplatelet and statin therapy.  Should she have recurrent symptoms suggestive of stroke she should seek immediate medical attention.       Diagnoses and all orders for this visit:    Left carotid stenosis  -     CTA neck w wo contrast; Future  -     Basic metabolic panel; Future    Cerebrovascular accident (CVA) due to stenosis of left carotid artery (HCC)  -     CTA neck w wo contrast; Future  -     Basic metabolic panel; Future    Stage 3 chronic kidney disease, unspecified whether stage 3a or 3b CKD (HCC)  -     Basic metabolic panel; Future    Carotid stenosis, left          Subjective:      Patient ID: Spring Ramos is a 83 y.o. female.    Patient is new to our office. She was referred  here by Dr. Margarita Brink DO. Patient had a CTA head and neck done 12/11/2023 at [facility]. Patient denies any new TIA or Stroke symptoms. Patient c/o continued left sided weakness from previous stroke. Patient is  "taking ASA 81 mg, Plavix and Rosuvastatin. Patient is a former smoker.     Spring is an 83-year-old woman who is referred by her PCP due to recent stroke she had while living in [place].  She was noted to have left carotid stenosis.  A left carotid endarterectomy was recommended however patient was adamantly opposed at that time.  She is now residing here in [place] with her sister.        The following portions of the patient's history were reviewed and updated as appropriate: allergies, current medications, past family history, past medical history, past social history, past surgical history, and problem list.    Review of Systems   Constitutional: Negative.    HENT: Negative.     Eyes: Negative.    Respiratory: Negative.     Cardiovascular: Negative.    Gastrointestinal: Negative.    Endocrine: Negative.    Genitourinary: Negative.    Musculoskeletal: Negative.    Skin: Negative.    Allergic/Immunologic: Negative.    Neurological:  Positive for dizziness and weakness (left side).   Hematological: Negative.    Psychiatric/Behavioral: Negative.           Objective:      /58 (BP Location: Right arm, Patient Position: Sitting, Cuff Size: Standard)   Pulse 63   Ht 4' 10\" (1.473 m)   SpO2 99%   BMI 21.95 kg/m²          Physical Exam  Constitutional:       General: She is not in acute distress.     Appearance: She is well-developed.   HENT:      Head: Normocephalic and atraumatic.   Eyes:      General: No scleral icterus.     Conjunctiva/sclera: Conjunctivae normal.   Neck:      Trachea: No tracheal deviation.   Cardiovascular:      Rate and Rhythm: Normal rate and regular rhythm.      Heart sounds: Normal heart sounds.   Pulmonary:      Effort: Pulmonary effort is normal.      Breath sounds: Normal breath sounds.   Abdominal:      General: There is no distension.      Palpations: Abdomen is soft. There is no mass (no appreciable aortic pulsation/aneurysm).      Tenderness: There is no abdominal " tenderness. There is no guarding or rebound.   Musculoskeletal:         General: Normal range of motion.      Cervical back: Normal range of motion and neck supple.   Skin:     General: Skin is warm and dry.   Neurological:      Mental Status: She is alert and oriented to person, place, and time.      Comments: Evidence of residual facial asymmetry on my exam.  Patient reports continued difficulty with expressive aphasia.  No obvious gross motor deficits.   Psychiatric:         Behavior: Behavior normal.

## 2024-02-28 NOTE — ASSESSMENT & PLAN NOTE
Infusion Nursing Note:  Rachele Martínez presents today for Sandostatin.    Patient seen by provider today: No   present during visit today: Not Applicable.    Note: Last received 5/11, tolerated well.    Intravenous Access:  No Intravenous access/labs at this visit.    Treatment Conditions:  Not Applicable.      Post Infusion Assessment:  Patient tolerated injection without incident to R gluteal area.    Discharge Plan:   Schedule reviewed with patient and/or family.  Patient will return 7/11 for next appointment.  Patient discharged in stable condition accompanied by: self/daughter.  Departure Mode: Ambulatory.    Jerilyn Guajardo RN                         Patient is an 83-year-old woman who is accompanied to the office by her sister.  She is referred by her PCP.  Patient normally resides in [place].  Back in December she was hospitalized and diagnosed with a stroke and high-grade left carotid stenosis.  At that time left carotid endarterectomy was recommended however patient adamantly refused.  She is now temporarily living here and [place] with family.  I do not have any actual images to review.  I do have the CT report which does suggest a 70% stenosis.  We discussed the indications for carotid intervention for symptomatic disease.  I do believe she meets qualification for left carotid intervention.  I requested that she attempt to obtain the CT images from [place] however she doubts that she would be able to obtain in a timely fashion.  As such I have ordered a CTA of the neck to assist with operative planning.  Patient to return to the office once CTA of the neck is completed.  She should remain on antiplatelet and statin therapy.  Should she have recurrent symptoms suggestive of stroke she should seek immediate medical attention.

## 2024-03-08 ENCOUNTER — HOSPITAL ENCOUNTER (OUTPATIENT)
Dept: NUCLEAR MEDICINE | Facility: HOSPITAL | Age: 84
End: 2024-03-08
Attending: INTERNAL MEDICINE
Payer: MEDICARE

## 2024-03-08 ENCOUNTER — HOSPITAL ENCOUNTER (OUTPATIENT)
Dept: NON INVASIVE DIAGNOSTICS | Facility: HOSPITAL | Age: 84
Discharge: HOME | End: 2024-03-08
Attending: INTERNAL MEDICINE
Payer: MEDICARE

## 2024-03-08 ENCOUNTER — HOSPITAL ENCOUNTER (OUTPATIENT)
Dept: NUCLEAR MEDICINE | Facility: HOSPITAL | Age: 84
Discharge: HOME | End: 2024-03-08
Attending: INTERNAL MEDICINE
Payer: MEDICARE

## 2024-03-08 ENCOUNTER — TELEPHONE (OUTPATIENT)
Dept: CARDIOLOGY CLINIC | Facility: CLINIC | Age: 84
End: 2024-03-08

## 2024-03-08 VITALS
BODY MASS INDEX: 22.04 KG/M2 | WEIGHT: 105 LBS | HEIGHT: 58 IN | SYSTOLIC BLOOD PRESSURE: 126 MMHG | OXYGEN SATURATION: 99 % | DIASTOLIC BLOOD PRESSURE: 70 MMHG | RESPIRATION RATE: 16 BRPM | HEART RATE: 64 BPM

## 2024-03-08 DIAGNOSIS — R06.02 SHORTNESS OF BREATH: ICD-10-CM

## 2024-03-08 DIAGNOSIS — I42.9 CARDIOMYOPATHY, UNSPECIFIED TYPE (HCC): ICD-10-CM

## 2024-03-08 DIAGNOSIS — I34.0 NONRHEUMATIC MITRAL VALVE REGURGITATION: ICD-10-CM

## 2024-03-08 LAB
CHEST PAIN STATEMENT: NORMAL
MAX DIASTOLIC BP: 70 MMHG
MAX PREDICTED HEART RATE: 138 BPM
NUC STRESS EJECTION FRACTION: 61 %
PROTOCOL NAME: NORMAL
RATE PRESSURE PRODUCT: NORMAL
REASON FOR TERMINATION: NORMAL
SL CV REST NUCLEAR ISOTOPE DOSE: 11 MCI
SL CV STRESS NUCLEAR ISOTOPE DOSE: 33 MCI
SL CV STRESS RECOVERY BP: NORMAL MMHG
SL CV STRESS RECOVERY HR: 90 BPM
SL CV STRESS RECOVERY O2 SAT: 99 %
STRESS ANGINA INDEX: 0
STRESS BASELINE BP: NORMAL MMHG
STRESS BASELINE HR: 64 BPM
STRESS O2 SAT REST: 99 %
STRESS PEAK HR: 104 BPM
STRESS POST EXERCISE DUR MIN: 3 MIN
STRESS POST EXERCISE DUR SEC: 0 SEC
STRESS POST O2 SAT PEAK: 90 %
STRESS POST PEAK BP: 100 MMHG
STRESS POST PEAK HR: 109 BPM
STRESS POST PEAK SYSTOLIC BP: 126 MMHG
TARGET HR FORMULA: NORMAL
TEST INDICATION: NORMAL

## 2024-03-08 PROCEDURE — 93018 CV STRESS TEST I&R ONLY: CPT | Performed by: INTERNAL MEDICINE

## 2024-03-08 PROCEDURE — 93016 CV STRESS TEST SUPVJ ONLY: CPT | Performed by: INTERNAL MEDICINE

## 2024-03-08 PROCEDURE — G1004 CDSM NDSC: HCPCS

## 2024-03-08 PROCEDURE — 78452 HT MUSCLE IMAGE SPECT MULT: CPT | Performed by: INTERNAL MEDICINE

## 2024-03-08 PROCEDURE — 93017 CV STRESS TEST TRACING ONLY: CPT

## 2024-03-08 PROCEDURE — 78452 HT MUSCLE IMAGE SPECT MULT: CPT

## 2024-03-08 PROCEDURE — A9502 TC99M TETROFOSMIN: HCPCS

## 2024-03-08 RX ORDER — REGADENOSON 0.08 MG/ML
0.4 INJECTION, SOLUTION INTRAVENOUS ONCE
Status: COMPLETED | OUTPATIENT
Start: 2024-03-08 | End: 2024-03-08

## 2024-03-08 RX ADMIN — REGADENOSON 0.4 MG: 0.08 INJECTION, SOLUTION INTRAVENOUS at 09:02

## 2024-03-08 NOTE — TELEPHONE ENCOUNTER
Yuri Iglesias MD  P  [facility]  Your stress test shows signs of damage to the heart muscle from a prior heart attack.  There is no other evidence to suggest other major blockages that would benefit from heart procedures/stents.  Your heart pumping function remains normal.  We will continue on current medical therapy and discuss further at your follow-up visit.  If you are continuing to have issues with chest pain or shortness of breath, then you should let me know earlier.

## 2024-03-08 NOTE — TELEPHONE ENCOUNTER
Spoke with sister Katerin and went over test results and Dr Iglesias's recommendations.  Also called patient and her niece and discussed results and Dr Iglesias's recommendations.  Apparently, there was something mentioned about possibly not needing Amlodipine during her stress test.  I told her to discuss further at her visit on 3/13/24.

## 2024-03-12 ENCOUNTER — HOSPITAL ENCOUNTER (OUTPATIENT)
Dept: CT IMAGING | Facility: HOSPITAL | Age: 84
Discharge: HOME | End: 2024-03-12
Attending: SURGERY
Payer: MEDICARE

## 2024-03-12 DIAGNOSIS — I65.22 LEFT CAROTID STENOSIS: ICD-10-CM

## 2024-03-12 DIAGNOSIS — I63.232 CEREBROVASCULAR ACCIDENT (CVA) DUE TO STENOSIS OF LEFT CAROTID ARTERY (HCC): ICD-10-CM

## 2024-03-12 PROCEDURE — 70498 CT ANGIOGRAPHY NECK: CPT

## 2024-03-12 RX ADMIN — IOHEXOL 70 ML: 350 INJECTION, SOLUTION INTRAVENOUS at 15:13

## 2024-03-13 ENCOUNTER — OFFICE VISIT (OUTPATIENT)
Dept: CARDIOLOGY CLINIC | Facility: CLINIC | Age: 84
End: 2024-03-13
Payer: MEDICARE

## 2024-03-13 VITALS
DIASTOLIC BLOOD PRESSURE: 72 MMHG | SYSTOLIC BLOOD PRESSURE: 122 MMHG | HEART RATE: 84 BPM | WEIGHT: 104.6 LBS | BODY MASS INDEX: 21.96 KG/M2 | HEIGHT: 58 IN

## 2024-03-13 DIAGNOSIS — R06.02 SHORTNESS OF BREATH: ICD-10-CM

## 2024-03-13 DIAGNOSIS — I42.9 CARDIOMYOPATHY, UNSPECIFIED TYPE (HCC): ICD-10-CM

## 2024-03-13 DIAGNOSIS — I34.0 NONRHEUMATIC MITRAL VALVE REGURGITATION: ICD-10-CM

## 2024-03-13 DIAGNOSIS — I50.32 CHRONIC DIASTOLIC HEART FAILURE (HCC): Primary | ICD-10-CM

## 2024-03-13 DIAGNOSIS — I63.239 CAROTID STENOSIS, SYMPTOMATIC, WITH INFARCTION (HCC): ICD-10-CM

## 2024-03-13 PROCEDURE — 99214 OFFICE O/P EST MOD 30 MIN: CPT | Performed by: INTERNAL MEDICINE

## 2024-03-13 RX ORDER — AMLODIPINE BESYLATE 5 MG/1
5 TABLET ORAL DAILY
Qty: 90 TABLET | Refills: 1 | Status: SHIPPED | OUTPATIENT
Start: 2024-03-13

## 2024-03-13 RX ORDER — TORSEMIDE 20 MG/1
40 TABLET ORAL DAILY
Qty: 120 TABLET | Refills: 2 | Status: SHIPPED | OUTPATIENT
Start: 2024-03-13

## 2024-03-13 NOTE — PROGRESS NOTES
[facility] [place]   [address] [facility] [address]                                            Cardiology Office Consult  Spring Ramos, 83 y.o. female  YOB: 1940  MRN: [id number] Encounter: [id number]      PCP - Margarita Brink DO  Referring Provider - No ref. provider found    Chief Complaint   Patient presents with   • Follow-up       Assessment  Mitral regurgitation  TTE ([place], [place]) - 12/2024 - LVEF 50%, hypokinesis of inferolateral and anterolateral walls, moderate-severe MR, mild-moderate PHTN - PASP 41  Chronic diastolic heart failure  Shortness of breath  Recent left parietal stroke  Recent type 2 MI  Left carotid artery stenosis  Hypertension  Hyperlipidemia    Plan  Chronic diastolic heart failure, Cardiomyopathy, mitral regurgitation, moderate to severe  Overview  2/15/24 - initial OV with me after moving from [place]  Clinically appears volume overloaded with significant lower extremity edema, orthopnea and PND  On torsemide 10 mg daily since 12/2023  Rx: Increase torsemide to 40 mg daily for 3 days and then continue torsemide 20 mg daily  Check follow-up echocardiogram and nuclear Rx   2/23/24 TTE - LVEF 50%, hypokinesis of lateral wall, dilated atria, moderate MR, PASP 46  3/8/24 Nuclear Rx: infarct involving lateral wall, no ischemia  3/13/24: follow up visit  Edema has been mostly unchanged.  It did improve initially with the 40 mg of torsemide but then seems to be persistent thereafter  She is still on amlodipine 10  Impression  Remains volume overloaded with mild-moderate pulmonary hypertension related to probable recent MI in [place]  Plan  Increase torsemide to 40 mg daily   Continue K-dur 10 bid  Follow up BMP, Mg in 7-10 days  Keep a log of daily weight at home  If weight gain greater than 3 pounds per day or greater than 5 pounds per week, then can take extra torsemide 20 as needed  Decrease amlodipine to 5 mg daily  She is  already concerned about being on all these multiple medications and does not want any additional medications as far as possible  Continue aspirin, rosuvastatin    Hypertension  Remains well-controlled, 122/72  Decrease amlodipine to 5 mg daily and continue torsemide at a higher dose     Recent stroke, Left carotid artery stenosis  12/2023: Stroke with aphasia (in [place])-> improving with speech therapy  On evaluation in [place], this was felt to be related to severe left carotid artery stenosis and she was recommended carotid endarterectomy but she adamantly refused  She was prescribed aspirin, Plavix and also placed on Eliquis for 30 days --> she used for 3 days but then had significant nosebleed and as a result stopped Eliquis by herself  Remains on aspirin, Plavix, rosuvastatin per primary  Saw vascular surgeon and had repeat CTA neck done yesterday --> results still pending --> Follow-up with PCP/neurologist/vascular surgeon for further treatment    No results found for this visit on 03/13/24.      Orders Placed This Encounter   Procedures   • Basic metabolic panel   • Magnesium     Return in about 6 weeks (around 4/24/2024), or if symptoms worsen or fail to improve.      History of Present Illness   83 y.o. female comes in as a new patient for establishment of care regarding recent findings of heart failure and type 2 MI    She used to live in [place] and in December 2023 had issues with vision problems and speech difficulty which led her into the hospital in [facility].  She was eventually diagnosed with left parietal stroke and was found to have severe left carotid artery stenosis.  She was recommended carotid endarterectomy, but apparently due to concerns about her  having had a stroke during such procedure she adamantly refused to get this done.  During this hospitalization, she also had issues with mild troponin elevation in the 700s, with concerns about type II MI.  She did not have any chest  pain or shortness of breath at that time and an echocardiogram performed during this showed low normal EF at 50% with inferolateral hypokinesis and moderate to severe MR.  There is concerned about probable old left circumflex territory infarct and resultant MR.  She was treated with IV diuresis during this and recommended outpatient cardiology follow-up.  She was discharged within a short period.    Subsequently, as most of her family including her sister and niece live appear in the [facility] area they decided to bring her here instead to help care for her.  She has transition to getting speech therapy here and is doing a lot better.  She was subsequently established with a PCP and is now here for further cardiac consultation and is awaiting vascular surgery follow-up as well    No current complaints of chest pain, palpitations or dizziness.  She does report ongoing issues with shortness of breath with mild to moderate exertion as well as orthopnea and PND.  She has had increased lower extremity edema over the last month.    Interval history - 3/13/2024  She returns for follow-up after about 1 month.  She is here today with her daughter for the visit.  In the interim she completed the echocardiogram and nuclear stress test and has been taking the higher dose of torsemide.  She notes that initially with the higher dose of torsemide 40 her swelling had seem to go down, but it has since persisted on the lower dose.  She remains free of chest pain, palpitations or dizziness.  She does note on and off leg cramping.  She does feel a bit short of breath when she goes up a flight of stairs.      Historical Information   Past Medical History:   Diagnosis Date   • Acute ischemic left middle cerebral artery (MCA) stroke 12/11/2023   • Carotid artery stenosis    • Coronary artery disease    • Diastolic CHF    • Hyperlipidemia    • Hypertension    • Mitral regurgitation    • NSTEMI (non-ST elevated myocardial infarction)  12/11/2023     History reviewed. No pertinent surgical history.  Family History   Problem Relation Age of Onset   • Kidney failure Mother    • Heart disease Father    • Diabetes Father    • Hypertension Father      Current Outpatient Medications   Medication Instructions   • albuterol (PROVENTIL HFA,VENTOLIN HFA) 90 mcg/act inhaler 2 puffs, Inhalation, Every 6 hours PRN   • amLODIPine (NORVASC) 5 mg, Oral, Daily   • Aspirin 81 MG CAPS 1 capsule, Oral, Daily   • Cholecalciferol (Vitamin D3) 1000 units CAPS 1 tablet, Oral, Daily   • clopidogrel (PLAVIX) 75 mg, Oral, Daily   • Coenzyme Q10 (Co Q 10) 100 MG CAPS 1 capsule, Oral, Daily   • potassium chloride (Klor-Con) 10 mEq tablet 10 mEq, Oral, 2 times daily   • rosuvastatin (CRESTOR) 10 mg, Oral, Daily   • torsemide (DEMADEX) 40 mg, Oral, Daily      Allergies   Allergen Reactions   • Metronidazole GI Intolerance   • Cefazolin Other (See Comments)     Social History     Socioeconomic History   • Marital status:      Spouse name: None   • Number of children: None   • Years of education: None   • Highest education level: None   Occupational History   • None   Tobacco Use   • Smoking status: Former     Types: Cigarettes   • Smokeless tobacco: Never   Vaping Use   • Vaping status: Never Used   Substance and Sexual Activity   • Alcohol use: Not Currently   • Drug use: Never   • Sexual activity: None   Other Topics Concern   • None   Social History Narrative   • None     Social Determinants of Health     Financial Resource Strain: Not on file   Food Insecurity: Not on file   Transportation Needs: No Transportation Needs (12/14/2023)    Received from [facility]    PRAPARE - Transportation    • Lack of Transportation (Medical): No    • Lack of Transportation (Non-Medical): No   Physical Activity: Not on file   Stress: Not on file   Social Connections: Not on file   Intimate Partner Violence: Not on file   Housing Stability: Not on file        Review  "of Systems   All other systems reviewed and are negative.        Vitals:  Vitals:    03/13/24 0814   BP: 122/72   Pulse: 84   Weight: 47.4 kg (104 lb 9.6 oz)   Height: 4' 10\" (1.473 m)     BMI - Body mass index is 21.86 kg/m².  Wt Readings from Last 7 Encounters:   03/13/24 47.4 kg (104 lb 9.6 oz)   03/08/24 47.6 kg (105 lb)   02/28/24 47.9 kg (105 lb 9.6 oz)   02/23/24 47.6 kg (105 lb)   02/15/24 48 kg (105 lb 12.8 oz)       Physical Exam  Vitals and nursing note reviewed.   Constitutional:       General: She is not in acute distress.     Appearance: Normal appearance. She is well-developed. She is not ill-appearing.   HENT:      Head: Normocephalic and atraumatic.      Nose: No congestion.   Eyes:      General: No scleral icterus.     Conjunctiva/sclera: Conjunctivae normal.   Neck:      Vascular: No carotid bruit or JVD.   Cardiovascular:      Rate and Rhythm: Normal rate and regular rhythm. Occasional Extrasystoles are present.     Pulses: Normal pulses.      Heart sounds: Normal heart sounds. No murmur heard.     No friction rub. No gallop.   Pulmonary:      Effort: Pulmonary effort is normal. No respiratory distress.      Breath sounds: Normal breath sounds. No rales.   Abdominal:      General: There is no distension.      Palpations: Abdomen is soft.      Tenderness: There is no abdominal tenderness.   Musculoskeletal:         General: No swelling or tenderness.      Cervical back: Neck supple.      Right lower leg: Edema present.      Left lower leg: Edema present.   Skin:     General: Skin is warm.   Neurological:      Mental Status: She is alert and oriented to person, place, and time. Mental status is at baseline.      Comments: Intermittent, brief aphasia/speech difficulty - overall much improved   Psychiatric:         Attention and Perception: Attention normal.         Mood and Affect: Mood normal.         Behavior: Behavior normal. Behavior is cooperative.         Thought Content: Thought content " "normal.         Labs:  CBC:   Lab Results   Component Value Date    WBC 4.92 01/15/2024    RBC 3.79 (L) 01/15/2024    HGB 12.2 01/15/2024    HCT 37.3 01/15/2024    MCV 98 01/15/2024     01/15/2024    RDW 13.2 01/15/2024       CMP:   Lab Results   Component Value Date    K 3.9 02/21/2024     02/21/2024    CO2 28 02/21/2024    BUN 19 02/21/2024    CREATININE 1.11 02/21/2024    EGFR 46 02/21/2024    CALCIUM 9.1 02/21/2024    AST 22 01/15/2024    ALT 13 01/15/2024    ALKPHOS 46 01/15/2024       Magnesium:  No results found for: \"MG\"    Lipid Profile:   Lab Results   Component Value Date    HDL 60 01/15/2024    TRIG 94 01/15/2024    LDLCALC 60 01/15/2024       Thyroid Studies: No results found for: \"PAA6XMAWHWFC\", \"T3FREE\", \"FREET4\", \"Z7WOKSC\", \"E9YXDBZ\"    A1c:  No components found for: \"HGA1C\"    INR:  No results found for: \"INR\"5    Cardiac testing:   No results found for this or any previous visit.    No results found for this or any previous visit.    No results found for this or any previous visit.    No results found for this or any previous visit.      NM myocardial perfusion spect (rx stress and/or rest)  •  Stress Combined Conclusion: Left ventricular perfusion is abnormal.   There is a probable infarct of the basal lateral wall without demonstrable   reversibility/ischemia.  •  Stress Function: Stress ejection fraction is 61%, with focal basal   lateral wall hypokinesis.  •  Perfusion: There is a left ventricular perfusion defect that is medium   in size present in the basal lateral location(s) that is fixed.  remaining   segments well perfused, no ischemia identified.  •  Stress ECG: Down sloping ST depression in the inferolateral leads (II,   III, aVF, V4, V5 and V6) is noted. Arrhythmias during recovery: PACs, rare   PVCs. The stress ECG is consistent with ischemia after pharmacologic   vasodilation, without reproduction of symptoms.  •  There was no chest pain.  Stress strip  Confirmed by " ERNESTO MALDONADO ([id number]),  Mariann Agrawal ([id number]) on 3/8/2024 10:31:25 AM

## 2024-03-22 ENCOUNTER — OFFICE VISIT (OUTPATIENT)
Dept: VASCULAR SURGERY | Facility: CLINIC | Age: 84
End: 2024-03-22
Payer: MEDICARE

## 2024-03-22 VITALS
HEART RATE: 82 BPM | DIASTOLIC BLOOD PRESSURE: 80 MMHG | SYSTOLIC BLOOD PRESSURE: 140 MMHG | WEIGHT: 103 LBS | HEIGHT: 58 IN | OXYGEN SATURATION: 98 % | BODY MASS INDEX: 21.62 KG/M2

## 2024-03-22 DIAGNOSIS — I63.239 ASYMPTOMATIC CAROTID ARTERY STENOSIS WITH INFARCTION (HCC): Primary | ICD-10-CM

## 2024-03-22 DIAGNOSIS — I74.9 EMBOLISM AND THROMBOSIS OF UNSPECIFIED ARTERY (HCC): ICD-10-CM

## 2024-03-22 DIAGNOSIS — N18.30 STAGE 3 CHRONIC KIDNEY DISEASE, UNSPECIFIED WHETHER STAGE 3A OR 3B CKD (HCC): ICD-10-CM

## 2024-03-22 DIAGNOSIS — I63.9 CEREBROVASCULAR ACCIDENT (CVA), UNSPECIFIED MECHANISM (HCC): ICD-10-CM

## 2024-03-22 DIAGNOSIS — I65.23 ASYMPTOMATIC CAROTID ARTERY STENOSIS, BILATERAL: ICD-10-CM

## 2024-03-22 PROCEDURE — 99214 OFFICE O/P EST MOD 30 MIN: CPT | Performed by: SURGERY

## 2024-03-22 NOTE — ASSESSMENT & PLAN NOTE
Patient returns to the office accompanied by family/friend to review CTA of the head and neck.  While she was living in [place] she did sustain a left-sided stroke.  This was back in December.  She subsequently moved to the area set up family/friends can help with her overall care.  She continues to have expressive aphasia.  She otherwise does not demonstrate any focal motor deficits.  Her CT of the neck shows bilateral stenoses of approximately 60%.  We discussed that typically the risk of recurrent stroke is greatest immediate period after the index event.  Since several months have elapsed since her stroke and that now, she is on optimal medical therapy recommend continued nonsurgical management as her risk of stroke is now similar to the asymptomatic disease cohort.  We reviewed the signs and symptoms of stroke.  If any such symptoms should arise she should seek immediate medical attention.  Recommend surveillance carotid duplex in 6 months.

## 2024-03-22 NOTE — PROGRESS NOTES
Assessment/Plan:    Asymptomatic carotid artery stenosis, bilateral  Patient returns to the office accompanied by family/friend to review CTA of the head and neck.  While she was living in [place] she did sustain a left-sided stroke.  This was back in December.  She subsequently moved to the area set up family/friends can help with her overall care.  She continues to have expressive aphasia.  She otherwise does not demonstrate any focal motor deficits.  Her CT of the neck shows bilateral stenoses of approximately 60%.  We discussed that typically the risk of recurrent stroke is greatest immediate period after the index event.  Since several months have elapsed since her stroke and that now, she is on optimal medical therapy recommend continued nonsurgical management as her risk of stroke is now similar to the asymptomatic disease cohort.  We reviewed the signs and symptoms of stroke.  If any such symptoms should arise she should seek immediate medical attention.  Recommend surveillance carotid duplex in 6 months.       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 female.    Patient presents for review of CTA neck done 3/12/24. She denies TIA/CVA symptoms. She reports intermittent light headedness and some trouble finding words. She is taking ASA 81 mg, Plavix, and Rosuvastatin.      Spring returns to the office to discuss results of CTA of the head and neck.        The following portions of the patient's history were reviewed and updated as appropriate: allergies,  "current medications, past family history, past medical history, past social history, past surgical history, and problem list.    Review of Systems   Constitutional: Negative.    HENT: Negative.     Eyes: Negative.  Negative for visual disturbance.   Respiratory: Negative.     Cardiovascular: Negative.    Gastrointestinal: Negative.    Endocrine: Negative.    Genitourinary: Negative.    Musculoskeletal:  Positive for arthralgias.   Skin: Negative.    Allergic/Immunologic: Negative.    Neurological:  Positive for light-headedness. Negative for dizziness, facial asymmetry, speech difficulty, weakness and headaches.   Hematological: Negative.    Psychiatric/Behavioral: Negative.           Objective:      /80 (BP Location: Left arm, Patient Position: Sitting, Cuff Size: Standard)   Pulse 82   Ht 4' 10\" (1.473 m)   Wt 46.7 kg (103 lb)   SpO2 98%   BMI 21.53 kg/m²          Physical Exam  Constitutional:       General: She is not in acute distress.     Appearance: She is well-developed.   HENT:      Head: Normocephalic and atraumatic.   Eyes:      General: No scleral icterus.     Conjunctiva/sclera: Conjunctivae normal.   Neck:      Trachea: No tracheal deviation.   Cardiovascular:      Rate and Rhythm: Normal rate and regular rhythm.      Heart sounds: Normal heart sounds.   Pulmonary:      Effort: Pulmonary effort is normal.      Breath sounds: Normal breath sounds.   Abdominal:      General: There is no distension.      Palpations: Abdomen is soft. There is no mass (no appreciable aortic pulsation/aneurysm).      Tenderness: There is no abdominal tenderness. There is no guarding or rebound.   Musculoskeletal:         General: Normal range of motion.      Cervical back: Normal range of motion and neck supple.   Skin:     General: Skin is warm and dry.   Neurological:      Mental Status: She is alert and oriented to person, place, and time.   Psychiatric:         Mood and Affect: Mood normal.         Behavior: " Behavior normal.         Thought Content: Thought content normal.         Judgment: Judgment normal.           CTA neck:  IMPRESSION:     Negative CTA neck for large vessel occlusion, dissection, or aneurysm.     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 Additionally there is moderate-to-severe stenosis in hypoplastic left vertebral artery V4 segment due to calcified atherosclerotic disease.     Moderate stenosis (approximately 60% narrowing) in bilateral ICA proximal cervical segment due to atherosclerotic disease.     Moderate stenosis in origin of hypoplastic left vertebral artery due to atherosclerotic disease.     Partially imaged chronic appearing infarcts in visualized left posterior temporal and right occipital lobes. Chronic infarcts were noted on [facility] Everywhere in [facility] for MRI brain without contrast dated 12/13/2023. Consider   follow-up CT head without contrast for further evaluation and comparison with prior imaging.

## 2024-03-25 ENCOUNTER — APPOINTMENT (OUTPATIENT)
Dept: LAB | Facility: CLINIC | Age: 84
End: 2024-03-25
Payer: MEDICARE

## 2024-03-25 DIAGNOSIS — I63.232 CEREBROVASCULAR ACCIDENT (CVA) DUE TO STENOSIS OF LEFT CAROTID ARTERY (HCC): ICD-10-CM

## 2024-03-25 DIAGNOSIS — N18.30 STAGE 3 CHRONIC KIDNEY DISEASE, UNSPECIFIED WHETHER STAGE 3A OR 3B CKD (HCC): ICD-10-CM

## 2024-03-25 DIAGNOSIS — I34.0 NONRHEUMATIC MITRAL VALVE REGURGITATION: ICD-10-CM

## 2024-03-25 DIAGNOSIS — I65.22 LEFT CAROTID STENOSIS: ICD-10-CM

## 2024-03-25 DIAGNOSIS — I50.32 CHRONIC DIASTOLIC HEART FAILURE (HCC): ICD-10-CM

## 2024-03-25 LAB
ANION GAP SERPL CALCULATED.3IONS-SCNC: 9 MMOL/L (ref 4–13)
BUN SERPL-MCNC: 24 MG/DL (ref 5–25)
CALCIUM SERPL-MCNC: 8.9 MG/DL (ref 8.4–10.2)
CHLORIDE SERPL-SCNC: 100 MMOL/L (ref 96–108)
CO2 SERPL-SCNC: 28 MMOL/L (ref 21–32)
CREAT SERPL-MCNC: 1.06 MG/DL (ref 0.6–1.3)
GFR SERPL CREATININE-BSD FRML MDRD: 49 ML/MIN/1.73SQ M
GLUCOSE P FAST SERPL-MCNC: 103 MG/DL (ref 65–99)
MAGNESIUM SERPL-MCNC: 2.3 MG/DL (ref 1.9–2.7)
POTASSIUM SERPL-SCNC: 3.9 MMOL/L (ref 3.5–5.3)
SODIUM SERPL-SCNC: 137 MMOL/L (ref 135–147)

## 2024-03-25 PROCEDURE — 36415 COLL VENOUS BLD VENIPUNCTURE: CPT

## 2024-03-25 PROCEDURE — 83735 ASSAY OF MAGNESIUM: CPT

## 2024-03-25 PROCEDURE — 80048 BASIC METABOLIC PNL TOTAL CA: CPT
